# Patient Record
Sex: MALE | Race: WHITE | HISPANIC OR LATINO | Employment: FULL TIME | URBAN - NONMETROPOLITAN AREA
[De-identification: names, ages, dates, MRNs, and addresses within clinical notes are randomized per-mention and may not be internally consistent; named-entity substitution may affect disease eponyms.]

---

## 2024-09-22 ENCOUNTER — OFFICE VISIT (OUTPATIENT)
Dept: URGENT CARE | Facility: CLINIC | Age: 24
End: 2024-09-22
Payer: COMMERCIAL

## 2024-09-22 VITALS
HEIGHT: 71 IN | BODY MASS INDEX: 32.45 KG/M2 | RESPIRATION RATE: 16 BRPM | WEIGHT: 231.8 LBS | HEART RATE: 55 BPM | SYSTOLIC BLOOD PRESSURE: 126 MMHG | DIASTOLIC BLOOD PRESSURE: 76 MMHG | TEMPERATURE: 96.6 F | OXYGEN SATURATION: 98 %

## 2024-09-22 DIAGNOSIS — K04.7 DENTAL INFECTION: ICD-10-CM

## 2024-09-22 PROCEDURE — 99203 OFFICE O/P NEW LOW 30 MIN: CPT | Performed by: NURSE PRACTITIONER

## 2024-09-22 PROCEDURE — 3078F DIAST BP <80 MM HG: CPT | Performed by: NURSE PRACTITIONER

## 2024-09-22 PROCEDURE — 3074F SYST BP LT 130 MM HG: CPT | Performed by: NURSE PRACTITIONER

## 2024-09-22 RX ORDER — IBUPROFEN 600 MG/1
600 TABLET, FILM COATED ORAL EVERY 6 HOURS PRN
Qty: 30 TABLET | Refills: 0 | Status: SHIPPED | OUTPATIENT
Start: 2024-09-22

## 2024-09-22 RX ORDER — OXYCODONE AND ACETAMINOPHEN 5; 325 MG/1; MG/1
1 TABLET ORAL
COMMUNITY
Start: 2024-06-03 | End: 2024-09-22

## 2024-09-22 RX ORDER — CLINDAMYCIN HCL 300 MG
300 CAPSULE ORAL 3 TIMES DAILY
Qty: 21 CAPSULE | Refills: 0 | Status: SHIPPED | OUTPATIENT
Start: 2024-09-22 | End: 2024-09-29

## 2024-09-22 ASSESSMENT — ENCOUNTER SYMPTOMS
FACIAL SWELLING: 1
FEVER: 0
SINUS PRESSURE: 1

## 2024-09-22 NOTE — PATIENT INSTRUCTIONS
-Oral hydration.  -Ice pack for pain and swelling.  -Tylenol and NSAIDs (Ibuprofen) for pain and inflammation.  -Dental Hygiene  -Reduce sugar-rich foods or beverages.     Follow up with dentist. Follow up urgently for worsening symptoms or increased signs of infection (redness, pus, increased pain, increased swelling or fever). Emergently for swelling to neck or throat, difficulty swallowing, difficulty opening mouth, shortness of breath.

## 2024-09-22 NOTE — PROGRESS NOTES
Subjective:     Skyler Cho is a 23 y.o. male who presents for Facial Swelling (Pressure on R side of face, swelling, R mouth pain x last night)      Had had dental pain starting 1 month ago, and sensitivity to temperatures. States the current pain and swelling developed over night. No current pain, stating it's more like a pressure. Had tried a peroxide rinse.     Facial Swelling  This is a new problem. The current episode started yesterday. Pertinent negatives include no fever or sore throat.   Dental Problems   This is a recurrent problem. Associated symptoms include facial pain, sinus pressure and thermal sensitivity. Pertinent negatives include no difficulty swallowing or fever.       History reviewed. No pertinent past medical history.    History reviewed. No pertinent surgical history.    Social History     Socioeconomic History    Marital status: Single     Spouse name: Not on file    Number of children: Not on file    Years of education: Not on file    Highest education level: Not on file   Occupational History    Not on file   Tobacco Use    Smoking status: Not on file    Smokeless tobacco: Not on file   Substance and Sexual Activity    Alcohol use: Not on file    Drug use: Not on file    Sexual activity: Not on file   Other Topics Concern    Not on file   Social History Narrative    Not on file     Social Determinants of Health     Financial Resource Strain: Not on file   Food Insecurity: Not on file   Transportation Needs: Not on file   Physical Activity: Not on file   Stress: Not on file   Social Connections: Not on file   Intimate Partner Violence: Not on file   Housing Stability: Not on file        History reviewed. No pertinent family history.     Allergies   Allergen Reactions    Penicillin G Rash and Shortness of Breath       Review of Systems   Constitutional:  Negative for fever.   HENT:  Positive for dental problem, facial swelling and sinus pressure. Negative for sore throat.   "  Respiratory:  Negative for shortness of breath.    All other systems reviewed and are negative.       Objective:   /76 (BP Location: Left arm, Patient Position: Sitting, BP Cuff Size: Adult long)   Pulse (!) 55   Temp 35.9 °C (96.6 °F) (Temporal)   Resp 16   Ht 1.803 m (5' 11\")   Wt 105 kg (231 lb 12.8 oz)   SpO2 98%   BMI 32.33 kg/m²     Physical Exam  Vitals reviewed.   Constitutional:       General: He is not in acute distress.  HENT:      Nose:      Right Sinus: Maxillary sinus tenderness present.      Comments: Right facial swelling.      Mouth/Throat:      Lips: Pink.      Mouth: Mucous membranes are moist.      Dentition: Dental tenderness present.      Pharynx: Oropharynx is clear.   Pulmonary:      Effort: Pulmonary effort is normal. No respiratory distress.   Musculoskeletal:      Cervical back: No edema or crepitus.   Skin:     General: Skin is warm and dry.   Neurological:      Mental Status: He is alert and oriented to person, place, and time.         Assessment/Plan:   1. Dental infection  - clindamycin (CLEOCIN) 300 MG Cap; Take 1 Capsule by mouth 3 times a day for 7 days.  Dispense: 21 Capsule; Refill: 0  - ibuprofen (MOTRIN) 600 MG Tab; Take 1 Tablet by mouth every 6 hours as needed for Inflammation, Moderate Pain or Mild Pain.  Dispense: 30 Tablet; Refill: 0    -Oral hydration.  -Ice pack for pain and swelling.  -Tylenol and NSAIDs (Ibuprofen) for pain and inflammation.  -Dental Hygiene  -Reduce sugar-rich foods or beverages.     Follow up with dentist. Follow up urgently for worsening symptoms or increased signs of infection (redness, pus, increased pain, increased swelling or fever). Emergently for swelling to neck or throat, difficulty swallowing, difficulty opening mouth, shortness of breath.    Symptoms are consistent with an upper dental infection. Clear airway. Initiated antibiotic coverage, and encourage a dental follow up.    Differential diagnosis, natural history, " supportive care, and indications for immediate follow-up discussed.

## 2024-09-28 PROBLEM — I44.0 HEART BLOCK, FIRST DEGREE: Status: ACTIVE | Noted: 2021-06-14

## 2024-09-28 PROBLEM — J45.20 MILD INTERMITTENT ASTHMA WITHOUT COMPLICATION: Status: ACTIVE | Noted: 2021-06-14

## 2024-09-28 PROBLEM — N47.1 PHIMOSIS: Status: ACTIVE | Noted: 2023-06-02

## 2024-09-28 PROBLEM — I35.1 MILD AORTIC REGURGITATION: Status: ACTIVE | Noted: 2021-06-24

## 2024-09-28 PROBLEM — I42.9 CARDIOMYOPATHY (HCC): Status: ACTIVE | Noted: 2021-06-24

## 2024-09-28 ASSESSMENT — ENCOUNTER SYMPTOMS
SORE THROAT: 0
SHORTNESS OF BREATH: 0